# Patient Record
Sex: FEMALE | Race: WHITE | NOT HISPANIC OR LATINO | Employment: STUDENT | URBAN - METROPOLITAN AREA
[De-identification: names, ages, dates, MRNs, and addresses within clinical notes are randomized per-mention and may not be internally consistent; named-entity substitution may affect disease eponyms.]

---

## 2024-03-16 ENCOUNTER — HOSPITAL ENCOUNTER (EMERGENCY)
Facility: HOSPITAL | Age: 34
Discharge: HOME | End: 2024-03-16
Attending: EMERGENCY MEDICINE
Payer: COMMERCIAL

## 2024-03-16 VITALS
OXYGEN SATURATION: 100 % | SYSTOLIC BLOOD PRESSURE: 120 MMHG | WEIGHT: 122 LBS | TEMPERATURE: 98.2 F | DIASTOLIC BLOOD PRESSURE: 84 MMHG | HEART RATE: 85 BPM | HEIGHT: 64 IN | RESPIRATION RATE: 18 BRPM | BODY MASS INDEX: 20.83 KG/M2

## 2024-03-16 DIAGNOSIS — K04.7 DENTAL ABSCESS: Primary | ICD-10-CM

## 2024-03-16 PROCEDURE — 96372 THER/PROPH/DIAG INJ SC/IM: CPT

## 2024-03-16 PROCEDURE — 2500000004 HC RX 250 GENERAL PHARMACY W/ HCPCS (ALT 636 FOR OP/ED): Performed by: EMERGENCY MEDICINE

## 2024-03-16 PROCEDURE — 99283 EMERGENCY DEPT VISIT LOW MDM: CPT

## 2024-03-16 PROCEDURE — 2500000001 HC RX 250 WO HCPCS SELF ADMINISTERED DRUGS (ALT 637 FOR MEDICARE OP): Performed by: EMERGENCY MEDICINE

## 2024-03-16 RX ORDER — KETOROLAC TROMETHAMINE 10 MG/1
10 TABLET, FILM COATED ORAL EVERY 6 HOURS PRN
Qty: 20 TABLET | Refills: 0 | Status: SHIPPED | OUTPATIENT
Start: 2024-03-16 | End: 2024-03-16 | Stop reason: SDUPTHER

## 2024-03-16 RX ORDER — KETOROLAC TROMETHAMINE 30 MG/ML
30 INJECTION, SOLUTION INTRAMUSCULAR; INTRAVENOUS ONCE
Status: COMPLETED | OUTPATIENT
Start: 2024-03-16 | End: 2024-03-16

## 2024-03-16 RX ORDER — KETOROLAC TROMETHAMINE 10 MG/1
10 TABLET, FILM COATED ORAL EVERY 6 HOURS PRN
Qty: 20 TABLET | Refills: 0 | Status: SHIPPED | OUTPATIENT
Start: 2024-03-16 | End: 2024-03-21

## 2024-03-16 RX ORDER — CLINDAMYCIN HYDROCHLORIDE 300 MG/1
300 CAPSULE ORAL 3 TIMES DAILY
Qty: 30 CAPSULE | Refills: 0 | Status: SHIPPED | OUTPATIENT
Start: 2024-03-16 | End: 2024-03-26

## 2024-03-16 RX ORDER — CLINDAMYCIN HYDROCHLORIDE 300 MG/1
300 CAPSULE ORAL ONCE
Status: COMPLETED | OUTPATIENT
Start: 2024-03-16 | End: 2024-03-16

## 2024-03-16 RX ORDER — CLINDAMYCIN HYDROCHLORIDE 300 MG/1
300 CAPSULE ORAL 3 TIMES DAILY
Qty: 30 CAPSULE | Refills: 0 | Status: SHIPPED | OUTPATIENT
Start: 2024-03-16 | End: 2024-03-16 | Stop reason: SDUPTHER

## 2024-03-16 RX ADMIN — KETOROLAC TROMETHAMINE 30 MG: 30 INJECTION, SOLUTION INTRAMUSCULAR at 01:51

## 2024-03-16 RX ADMIN — CLINDAMYCIN HYDROCHLORIDE 300 MG: 300 CAPSULE ORAL at 01:35

## 2024-03-16 NOTE — ED PROVIDER NOTES
HPI   Chief Complaint   Patient presents with    Dental Pain     Pt stated she has been having tooth pain since Tuesday. Pt c/o right upper tooth pain. Pt was prescribed PENICILLIN 500 mg, but was afraid of taking it because her face swelled over night after taking it.        Patient presents to the emergency room today after stating that she noticed some swelling to the right side of her face.  The patient has a problem with a tooth ache, and was due to follow-up with her dentist today.  The patient was started on penicillin, however she stated that she noticed some swelling to the right side of her face, became concerned that perhaps she was having allergic reaction to the medication.  Patient has been on amoxicillin before in the past.  Patient states the swelling has since gone down.  Patient states that the pain is not severe.  Patient denies fevers or chills.  Patient denies any difficulty breathing.  Patient denies any difficulty swallowing.      No data recorded    Patient History   No past medical history on file.  No past surgical history on file.  No family history on file.  Social History     Tobacco Use    Smoking status: Not on file    Smokeless tobacco: Not on file   Substance Use Topics    Alcohol use: Not on file    Drug use: Not on file       Physical Exam   ED Triage Vitals [03/16/24 0057]   Temperature Heart Rate Respirations BP   36.8 °C (98.2 °F) 85 18 120/84      Pulse Ox Temp Source Heart Rate Source Patient Position   100 % Tympanic -- Sitting      BP Location FiO2 (%)     Left arm --       Physical Exam  Vitals and nursing note reviewed.   Constitutional:       General: She is not in acute distress.     Appearance: She is well-developed.   HENT:      Head: Normocephalic and atraumatic.      Mouth/Throat:      Comments: No periapical abscess noted.  Eyes:      Conjunctiva/sclera: Conjunctivae normal.   Cardiovascular:      Rate and Rhythm: Normal rate and regular rhythm.      Heart sounds:  No murmur heard.  Pulmonary:      Effort: Pulmonary effort is normal. No respiratory distress.      Breath sounds: Normal breath sounds.   Abdominal:      Palpations: Abdomen is soft.      Tenderness: There is no abdominal tenderness.   Musculoskeletal:      Cervical back: Neck supple.   Neurological:      Mental Status: She is alert.         ED Course & MDM   Diagnoses as of 03/16/24 0135   Dental abscess       Medical Decision Making  After my initial evaluation, I am going to switch the patient's antibiotic from penicillin to clindamycin at her request.  I will provide the patient with a shot of Toradol to help with the swelling.  The patient will put on Toradol and clindamycin for home.  The patient was encouraged to follow-up with her dentist as an outpatient.  Patient, my estimation, is not allergic to penicillin.        Procedure  Procedures     Joel Kim DO  03/16/24 0216

## 2024-10-17 ENCOUNTER — OFFICE VISIT (OUTPATIENT)
Dept: PRIMARY CARE | Facility: CLINIC | Age: 34
End: 2024-10-17
Payer: COMMERCIAL

## 2024-10-17 VITALS
SYSTOLIC BLOOD PRESSURE: 104 MMHG | WEIGHT: 130 LBS | RESPIRATION RATE: 16 BRPM | OXYGEN SATURATION: 99 % | HEART RATE: 90 BPM | BODY MASS INDEX: 22.31 KG/M2 | DIASTOLIC BLOOD PRESSURE: 62 MMHG

## 2024-10-17 DIAGNOSIS — F32.A DEPRESSION, UNSPECIFIED DEPRESSION TYPE: ICD-10-CM

## 2024-10-17 DIAGNOSIS — F90.0 ATTENTION DEFICIT HYPERACTIVITY DISORDER (ADHD), PREDOMINANTLY INATTENTIVE TYPE: Primary | ICD-10-CM

## 2024-10-17 PROBLEM — J45.909 ASTHMA: Status: ACTIVE | Noted: 2024-10-17

## 2024-10-17 PROBLEM — F90.9 ADHD: Status: ACTIVE | Noted: 2024-10-17

## 2024-10-17 PROCEDURE — 99204 OFFICE O/P NEW MOD 45 MIN: CPT | Performed by: INTERNAL MEDICINE

## 2024-10-17 RX ORDER — LISDEXAMFETAMINE DIMESYLATE 10 MG/1
10 CAPSULE ORAL
Qty: 90 CAPSULE | Refills: 0 | Status: SHIPPED | OUTPATIENT
Start: 2024-10-17

## 2024-10-17 RX ORDER — DEXTROAMPHETAMINE SACCHARATE, AMPHETAMINE ASPARTATE, DEXTROAMPHETAMINE SULFATE AND AMPHETAMINE SULFATE 5; 5; 5; 5 MG/1; MG/1; MG/1; MG/1
20 TABLET ORAL DAILY
Qty: 30 TABLET | Refills: 0 | Status: SHIPPED | OUTPATIENT
Start: 2024-10-17 | End: 2024-11-16

## 2024-10-17 RX ORDER — DEXTROAMPHETAMINE SACCHARATE, AMPHETAMINE ASPARTATE, DEXTROAMPHETAMINE SULFATE AND AMPHETAMINE SULFATE 5; 5; 5; 5 MG/1; MG/1; MG/1; MG/1
20 TABLET ORAL DAILY
Qty: 30 TABLET | Refills: 0 | Status: SHIPPED | OUTPATIENT
Start: 2024-12-16 | End: 2025-01-15

## 2024-10-17 RX ORDER — DEXTROAMPHETAMINE SACCHARATE, AMPHETAMINE ASPARTATE, DEXTROAMPHETAMINE SULFATE AND AMPHETAMINE SULFATE 5; 5; 5; 5 MG/1; MG/1; MG/1; MG/1
20 TABLET ORAL DAILY
Qty: 30 TABLET | Refills: 0 | Status: SHIPPED | OUTPATIENT
Start: 2024-11-16 | End: 2024-12-16

## 2024-10-17 RX ORDER — LISDEXAMFETAMINE DIMESYLATE 10 MG/1
1 CAPSULE ORAL
COMMUNITY
Start: 2024-09-26 | End: 2024-10-17 | Stop reason: SDUPTHER

## 2024-10-17 RX ORDER — DEXTROAMPHETAMINE SULFATE, DEXTROAMPHETAMINE SACCHARATE, AMPHETAMINE SULFATE AND AMPHETAMINE ASPARTATE 5; 5; 5; 5 MG/1; MG/1; MG/1; MG/1
1 CAPSULE, EXTENDED RELEASE ORAL
COMMUNITY
Start: 2024-09-18 | End: 2024-10-17 | Stop reason: ALTCHOICE

## 2024-10-17 RX ORDER — SERTRALINE HYDROCHLORIDE 50 MG/1
50 TABLET, FILM COATED ORAL DAILY
Qty: 90 TABLET | Refills: 3 | Status: SHIPPED | OUTPATIENT
Start: 2024-10-17 | End: 2025-10-12

## 2024-10-17 ASSESSMENT — PAIN SCALES - GENERAL: PAINLEVEL_OUTOF10: 0-NO PAIN

## 2024-10-17 NOTE — PROGRESS NOTES
Subjective   Madiha Mccray is a 34 y.o. female who presents for ADHD (Pt is being seen for adhd medication concerns).  Patient presents to [facility] care.  She has a past medical history of ADHD. She was sent to a pediatric psychiatrist by her pediatrician at age 6. She has been on medication since.   She requests medication refills.  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  Patient advised that she can have a short course of her stimulants but would need to follow with Psychiatry for any refills after that.    ADHD        Objective     /62   Pulse 90   Resp 16   Wt 59 kg (130 lb)   SpO2 99%   BMI 22.31 kg/m²      Physical Exam  Constitutional:       Appearance: Normal appearance.   HENT:      Head: Normocephalic and atraumatic.      Nose: Nose normal.      Mouth/Throat:      Mouth: Mucous membranes are moist.      Pharynx: Oropharynx is clear.   Eyes:      Extraocular Movements: Extraocular movements intact.      Pupils: Pupils are equal, round, and reactive to light.   Cardiovascular:      Rate and Rhythm: Normal rate and regular rhythm.   Pulmonary:      Effort: No respiratory distress.      Breath sounds: Normal breath sounds. No wheezing, rhonchi or rales.   Abdominal:      General: Bowel sounds are normal. There is no distension.      Palpations: Abdomen is soft.      Tenderness: There is no abdominal tenderness. There is no guarding.   Musculoskeletal:      Right lower leg: No edema.      Left lower leg: No edema.   Skin:     General: Skin is warm and dry.   Neurological:      Mental Status: She is alert and oriented to person, place, and time. Mental status is at baseline.   Psychiatric:         Mood and Affect: Mood normal.         Behavior: Behavior normal.         Assessment/Plan   Problem List Items Addressed This Visit       ADHD - Primary    Relevant Medications    Vyvanse 10 mg capsule    amphetamine-dextroamphetamine (Adderall) 20 mg tablet     amphetamine-dextroamphetamine (Adderall) 20 mg tablet (Start on 11/16/2024)    amphetamine-dextroamphetamine (Adderall) 20 mg tablet (Start on 12/16/2024)    Other Relevant Orders    Referral to Psychiatry     Other Visit Diagnoses       Depression, unspecified depression type        Relevant Medications    sertraline (Zoloft) 50 mg tablet    Other Relevant Orders    Referral to Psychiatry                 Toni Smith DO

## 2025-01-08 ENCOUNTER — APPOINTMENT (OUTPATIENT)
Dept: BEHAVIORAL HEALTH | Facility: CLINIC | Age: 35
End: 2025-01-08
Payer: COMMERCIAL

## 2025-01-08 DIAGNOSIS — F90.2 ATTENTION DEFICIT HYPERACTIVITY DISORDER (ADHD), COMBINED TYPE: ICD-10-CM

## 2025-01-08 DIAGNOSIS — F32.A DEPRESSION, UNSPECIFIED DEPRESSION TYPE: ICD-10-CM

## 2025-01-08 DIAGNOSIS — F90.0 ATTENTION DEFICIT HYPERACTIVITY DISORDER (ADHD), PREDOMINANTLY INATTENTIVE TYPE: ICD-10-CM

## 2025-01-08 PROCEDURE — 90791 PSYCH DIAGNOSTIC EVALUATION: CPT | Performed by: PSYCHOLOGIST

## 2025-01-08 NOTE — PROGRESS NOTES
Outpatient Psychology Initial Evaluation for ADHD    Start Time: 1500  Stop Time: 1550      Reason for Referral:    Madiha Mccray, a 34 y.o. female, presents for an ADHD evaluation. Patient is referred by Toni Smith DO.  Patient is informed of the purpose of this evaluation and agrees to participate.     Pt was diagnosed with ADHD, combined type at the age of 6. Pt states her pediatrician referred her for neuropsych testing, which confirmed the diagnosis. Pediatrician prescribed Ritalin. Pt now prescribed Vyvanse 40mg. Pt presents seeking re-evaluation of ADHD as an adult.     Procedures:  Medical records review  Clinical interview with Madiha Mccray    Developmental History:   -date/place of birth:  born and raised in [place], raised by both parents (dad  2 years ago). Pt has a younger brother who's 22. She had an older brother with a developmental disability who  from a blood clot when he was 27.    -pregnancy/birth complications: no  -childhood illness? Ear infections? Tubes in ears?: no  -history of PT/OT/Speech?: no  -developmental milestones, including sitting up, crawling, walking, talking: pt walked a bit late because older brother didn't want her to get her shoes dirty. Milestones otherwise on time.   -temperament/activity level as a young child: hyperactive as a child  -ER visits for injuries?: no     Family History:  -Will complete at next encounter    Academic History:   -Will complete at next encounter    Employment History:   -Will complete at next encounter    Relationships:   -Will complete at next encounter    Social Contacts:  -Will complete at next encounter    Hobbies/Interests:  -Will complete at next encounter    Other Areas of Concern:  -Will complete at next encounter    Past Psychiatric History  Previous diagnoses: yes - ADHD, combined type and depression  Previous psychiatric treatment and medication trials: currently prescribed 40 mg Vyvanse by a psychiatrist  through [facility]. Previously took Ritalin for many years then switched to Adderall around age 19 until recently. Pt also prescribed sertraline 50mg.   Previous psychotherapy: no  Previous self-injurious, non suicidal behavior: no   Previous psychiatric hospitalizations: no  Previous suicide attempts: no  History of abuse/trauma:  no  History of violence:  no  Depression: mostly seasonal affective  Anxiety: occasional panic attacks, sometimes worries too much  Isa: negative   Psychosis: negative   Sleep problems: Absent      Substance Use History  Use of marijuana: none  Use of alcohol: none  Use of caffeine: 8-12 oz cup of coffee per day  Tobacco use: smoked 1ppd from age [age]. Pt currently vapes nicotine daily, throughout the day. 1 bottle of vape juice lasts 2 weeks.   Recreational drugs: none    Psychosocial History  Marital Status: not  but in a committed relationships  Children: No  Living Situation: lives in [place]. She lives alone; has a dog and cat.  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    History of concussions, seizures, or TBI?: no    Surgical History  No past surgical history on file.    Current Medications  Current Outpatient Medications   Medication Instructions    amphetamine-dextroamphetamine (Adderall) 20 mg tablet 20 mg, oral, Daily    amphetamine-dextroamphetamine (Adderall) 20 mg tablet 20 mg, oral, Daily    amphetamine-dextroamphetamine (Adderall) 20 mg tablet 20 mg, oral, Daily    sertraline (ZOLOFT) 50 mg, oral, Daily    Vyvanse 10 mg, oral, Daily (0630)          Mental Status Evaluation  General Appearance: well groomed, appropriate eye contact  Attitude/Behavior:  cooperative  Motor: no psychomotor agitation or retardation, no tremor or other abnormal movements  Speech: occasionally rapid speech but otherwise WNL  Gait/Station: pt seated for telehealth interview  Mood: euthymic   Affect: euthymic, full-range  Thought Process: linear, goal directed  Thought Associations: no loosening of associations  Thought Content: No SI/HI or delusions  Perception: no perceptual abnormalities noted  Sensorium: alert and oriented to person, place, time and situation  Insight: intact  Judgment: intact  Cognition: cognitively intact to conversational testing with respect to attention, orientation, fund of knowledge, recent and remote memory, and language        Assessment  Pt with a hx of ADHD, Combined Type diagnosed in childhood presents for adult ADHD evaluation.      Impression  ADHD, Combined Type, by history  Other depressive disorder vs. SAD    Recommendations  1) Will continue evaluation at next encounter.     ______________________________________________________  Karolyn Campbell, PhD  ______________________________________________________  An interactive audio and video telecommunication system which permits real time communications between the patient (at the originating site) and provider (at the distant site) was utilized to provide this telehealth service.      Verbal consent was requested and obtained from Madiha Mccray on this date, 2025, for a telehealth visit.     I have confirmed the patient's identity via the following (minimum of three) acceptable identifiers as per  Policy PH-9: address, , SSN.

## 2025-01-15 ENCOUNTER — APPOINTMENT (OUTPATIENT)
Dept: BEHAVIORAL HEALTH | Facility: CLINIC | Age: 35
End: 2025-01-15
Payer: COMMERCIAL

## 2025-01-15 DIAGNOSIS — F90.2 ATTENTION DEFICIT HYPERACTIVITY DISORDER (ADHD), COMBINED TYPE: ICD-10-CM

## 2025-01-15 PROCEDURE — 90837 PSYTX W PT 60 MINUTES: CPT | Performed by: PSYCHOLOGIST

## 2025-01-15 NOTE — PROGRESS NOTES
Outpatient Psychology Evaluation for ADHD - Continued    Start Time: 1500  Stop Time: 1554      Reason for Referral:    Madiha Mccray, a 34 y.o. female, presents for an ADHD evaluation. Patient is referred by Toni Smith DO.  Patient is informed of the purpose of this evaluation and agrees to participate.     Pt was diagnosed with ADHD, combined type at the age of 6. Pt states her pediatrician referred her for neuropsych testing, which confirmed the diagnosis. Pediatrician prescribed Ritalin. Pt now prescribed Vyvanse 40mg. Pt presents seeking re-evaluation of ADHD as an adult.     Procedures:  Medical records review  Clinical interview with Madiha Mccray    Developmental History:   -date/place of birth:  born and raised in [place], raised by both parents (dad  2 years ago). Pt has a younger brother who's 22. She had an older brother with a developmental disability who  from a blood clot when he was 27.    -pregnancy/birth complications: no  -childhood illness? Ear infections? Tubes in ears?: no  -history of PT/OT/Speech?: no  -developmental milestones, including sitting up, crawling, walking, talking: pt walked a bit late because older brother didn't want her to get her shoes dirty. Milestones otherwise on time.   -temperament/activity level as a young child: hyperactive as a child  -ER visits for injuries?: no     Family History:  -parents'/siblings' education/occupation: mom didn't finish high school and stayed at home, dad didn't finish high school and worked in [employer]  -family psychiatric hx: mom with anxiety, younger brother with anxiety and OCD, older brother with developmental delay and schizophrenia  -family substance use hx:   -family history of problems with attention, learning, or impulsive behavior: Dad was very impulsive  -family diagnoses of ADHD: not official     Academic History:     Elementary School  -Where did you go to school? K-10 in person public school at [place] [employer],  "11-12 home school.   -attendance: good  -difficulty learning to read, math problems, etc.: no  -What did teachers say about you? prior to diagnosis that pt was bouncing off the walls  -Difficulty sitting still in class/need to move around: much better after she started on meds   -Difficulty paying attention/daydreaming: after meds, no  -Blurting out answers  -grades: did well   -tutoring: no  -special classes: no  -school behavior: good  -disciplinary actions: no  -best & worst subjects: worst was math, best was science    Middle School    -What was transition to middle school like?: fine   -Attendance: good  -grades? Any drop in grades?: \"ok\"  -tutoring: math  -Difficulty managing workload: sometimes not getting homework done  -More support from parents to complete work?: mom had to help her a lot but she usually didn't get started until later in the evening.  -Use of planner?: yes   -What did bedroom and backpack look like?  -extracurricular activities: beauty pageants  -school behavior  -disciplinary actions: was always late in the mornings and got detentions    High school  -What was transition to school school like?: Difficult. Pt's older brother  the first week of high school. Went to a bereavement group all of Freshman year.   -Attendance: good  -grades? Any drop in grades?: Grades dropped when brother   -tutoring: a friend helped pt with subjects she struggled with  -special classes: failed classes in 9th grade and had to go to summer school   -Difficulty managing workload: ongoing trouble getting started on work but also stopped taking ADHD meds around 17 and then restarted right before turning 19. Got motivated to go the college and new she needed to take the meds in order to do that.   -extracurricular activities: no  -school behavior: good  -disciplinary actions: detentions for being late  -GPA: \"not good\"   -AP classes?: no  -alcohol/substance use: occasional alcohol     -SAT/ACT?  -Prep " courses?    College  -Where did you attend?: [employer] for medical assisting (1 year), then did prereqs at [employer], then [employer] got Associate's degree (2.5 year), [employer] online to finish B.A. 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   -Pt's dad was also dying while she was finishing her B.A.     -Had to take NCLEX 5 times. Was able to pass after a medication change.    Graduate School  -Did an M.A. 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 Went back and finished her B.A. at this time.      Relationships:   -Will complete at next encounter    Social Contacts:  -Will complete at next encounter    Hobbies/Interests:  -Will complete at next encounter    Other Areas of Concern:  -Will complete at next encounter    Past Psychiatric History  Previous diagnoses: yes - ADHD, combined type and depression  Previous psychiatric treatment and medication trials: currently prescribed 40 mg Vyvanse by a psychiatrist through [facility]. Previously took Ritalin for many years then switched to Adderall around age 19 until recently. Pt also prescribed sertraline 50mg.   Previous psychotherapy: no  Previous self-injurious, non suicidal behavior: no   Previous psychiatric hospitalizations: no  Previous suicide attempts: no  History of abuse/trauma:  found her brother after he ; denied PTSD sx  History of violence:  no  Depression: mostly seasonal affective  Anxiety: occasional panic attacks, sometimes worries too much  Isa: negative   Psychosis: negative   Sleep problems: Absent      Substance Use History  Use of marijuana: none  Use of alcohol:  none  Use of caffeine: 8-12 oz cup of coffee per day  Tobacco use: smoked 1ppd from age [age]. Pt currently vapes nicotine daily, throughout the day. 1 bottle of vape juice lasts 2 weeks.   Recreational drugs: none    Psychosocial History  Marital Status: not  but in a committed relationships  Children: No  Living Situation: lives in [place]. She lives alone; has a dog and cat.  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    History of concussions, seizures, or TBI?: no    Surgical History  No past surgical history on file.    Current Medications  Current Outpatient Medications   Medication Instructions    amphetamine-dextroamphetamine (Adderall) 20 mg tablet 20 mg, oral, Daily    amphetamine-dextroamphetamine (Adderall) 20 mg tablet 20 mg, oral, Daily    amphetamine-dextroamphetamine (Adderall) 20 mg tablet 20 mg, oral, Daily    sertraline (ZOLOFT) 50 mg, oral, Daily    Vyvanse 10 mg, oral, Daily (0630)          Mental Status Evaluation  General Appearance: well groomed, appropriate eye contact  Attitude/Behavior: cooperative  Motor: no psychomotor agitation or retardation, no tremor or other abnormal movements  Speech: occasionally rapid speech but otherwise WNL  Gait/Station: pt seated for telehealth interview  Mood: euthymic   Affect: euthymic, full-range  Thought Process: linear, goal directed  Thought Associations: no loosening of associations  Thought Content: No SI/HI or delusions  Perception: no perceptual abnormalities noted  Sensorium: alert and oriented to person, place, time and situation  Insight: intact  Judgment: intact  Cognition: cognitively intact to conversational testing with respect to attention,  orientation, fund of knowledge, recent and remote memory, and language        Assessment  Pt with a hx of ADHD, Combined Type diagnosed in childhood presents for adult ADHD evaluation.      Impression  ADHD, Combined Type, by history  Other depressive disorder vs. SAD    Recommendations  1) Will continue evaluation at next encounter.     ______________________________________________________  Karolyn Campbell, PhD  ______________________________________________________  An interactive audio and video telecommunication system which permits real time communications between the patient (at the originating site) and provider (at the distant site) was utilized to provide this telehealth service.      Verbal consent was requested and obtained from Madiha Mccray on this date, 1/15/2025, for a telehealth visit.     I have confirmed the patient's identity via the following (minimum of three) acceptable identifiers as per  Policy PH-9: address, , SSN.

## 2025-01-27 ENCOUNTER — APPOINTMENT (OUTPATIENT)
Dept: BEHAVIORAL HEALTH | Facility: CLINIC | Age: 35
End: 2025-01-27
Payer: COMMERCIAL

## 2025-01-27 DIAGNOSIS — F90.2 ATTENTION DEFICIT HYPERACTIVITY DISORDER (ADHD), COMBINED TYPE: ICD-10-CM

## 2025-01-27 PROCEDURE — 90832 PSYTX W PT 30 MINUTES: CPT | Performed by: PSYCHOLOGIST

## 2025-01-27 NOTE — PROGRESS NOTES
Outpatient Psychology Evaluation for ADHD - Final    Start Time: 1500  Stop Time: 1530      Reason for Referral:    Madiha Mccray, a 34 y.o. female, presents for an ADHD evaluation. Patient is referred by Toni Smith DO.  Patient is informed of the purpose of this evaluation and agrees to participate.     Pt was diagnosed with ADHD, combined type at the age of 6. Pt states her pediatrician referred her for neuropsych testing, which confirmed the diagnosis. Pediatrician prescribed Ritalin. Pt now prescribed Vyvanse 40mg. Pt presents seeking re-evaluation of ADHD as an adult.     Procedures:  Medical records review  Academic records review  Clinical interview with Madiha Mccray    Developmental History:   -date/place of birth:  born and raised in [place], raised by both parents (dad  2 years ago). Pt has a younger brother who's 22. She had an older brother with a developmental disability who  from a blood clot when he was 27.    -pregnancy/birth complications: no  -childhood illness? Ear infections? Tubes in ears?: no  -history of PT/OT/Speech?: no  -developmental milestones, including sitting up, crawling, walking, talking: pt walked a bit late because older brother didn't want her to get her shoes dirty. Milestones otherwise on time.   -temperament/activity level as a young child: hyperactive as a child  -ER visits for injuries?: no     Family History:  -parents'/siblings' education/occupation: mom didn't finish high school and stayed at home, dad didn't finish high school and worked in [employer]  -family psychiatric hx: mom with anxiety, younger brother with anxiety and OCD, older brother with developmental delay and schizophrenia  -family substance use hx:   -family history of problems with attention, learning, or impulsive behavior: Dad was very impulsive  -family diagnoses of ADHD: not official     Academic History:     Elementary School  -Where did you go to school? K-10 in person public  "school at [employer], 11-12 home school.   -attendance: good  -difficulty learning to read, math problems, etc.: no  -What did teachers say about you? prior to diagnosis that pt was bouncing off the walls  -Difficulty sitting still in class/need to move around: much better after she started on meds   -Difficulty paying attention/daydreaming: after meds, no  -Blurting out answers  -grades: did well   -tutoring: no  -special classes: no  -school behavior: good  -disciplinary actions: no  -best & worst subjects: worst was math, best was science    Middle School    -What was transition to middle school like?: fine   -Attendance: good  -grades? Any drop in grades?: \"ok\"  -tutoring: math  -Difficulty managing workload: sometimes not getting homework done  -More support from parents to complete work?: mom had to help her a lot but she usually didn't get started until later in the evening.  -Use of planner?: yes   -What did bedroom and backpack look like?  -extracurricular activities: beauty pageants  -school behavior  -disciplinary actions: was always late in the mornings and got detentions    High school  -What was transition to school school like?: Difficult. Pt's older brother  the first week of high school. Went to a bereavement group all of Freshman year.   -Attendance: good  -grades? Any drop in grades?: Grades dropped when brother   -tutoring: a friend helped pt with subjects she struggled with  -special classes: failed classes in 9th grade and had to go to summer school   -Difficulty managing workload: ongoing trouble getting started on work but also stopped taking ADHD meds around 17 and then restarted right before turning 19. Got motivated to go the college and new she needed to take the meds in order to do that.   -extracurricular activities: no  -school behavior: good  -disciplinary actions: detentions for being late  -GPA: \"not good\"   -AP classes?: no  -alcohol/substance use: occasional alcohol "     College  -Where did you attend?: [employer] for medical assisting (1 year), then did prereqs at [employer], then [employer] got Associate's degree (2.5 year), [employer] online to finish B.A. 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   -Pt's dad was also dying while she was finishing her B.A.     -Had to take NCLEX 5 times. Was able to pass after a medication change.    Graduate School  -Did an M.A. 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 Went back and finished her B.A. at this time.   -Pt has a lot of trouble getting all her notes done at work. She is unable to focus. Has to work part-time so she can get her work done, which affects her financially.    -Pt often late to work   -Pt interrupts at work even though she doesn't mean to    Relationships:   -Boyfriend often complains that she doesn't listen to him.  -Boyfriend gets mad she forgets to do things he's asked her to do.     Social Contacts:  -leaves everything until the last minute and often late    Hobbies/Interests:  -Will start home projects and not finish     Other Areas of Concern:  -Will forget to pay bills     Past Psychiatric History  Previous diagnoses: yes - ADHD, combined type and depression  Previous psychiatric treatment and medication trials: currently prescribed 40 mg Vyvanse by a psychiatrist through [facility]. Previously took Ritalin for many years then switched to Adderall around age 19 until recently. Pt also prescribed sertraline 50mg.   Previous psychotherapy:  no  Previous self-injurious, non suicidal behavior: no   Previous psychiatric hospitalizations: no  Previous suicide attempts: no  History of abuse/trauma:  found her brother after he ; denied PTSD sx  History of violence:  no  Depression: mostly seasonal affective  Anxiety: occasional panic attacks, sometimes worries too much  Isa: negative   Psychosis: negative   Sleep problems: Absent      Substance Use History  Use of marijuana: none  Use of alcohol: none  Use of caffeine: 8-12 oz cup of coffee per day  Tobacco use: smoked 1ppd from age [age]. Pt currently vapes nicotine daily, throughout the day. 1 bottle of vape juice lasts 2 weeks.   Recreational drugs: none    Psychosocial History  Marital Status: not  but in a committed relationships  Children: No  Living Situation: lives in [place]. She lives alone; has a dog and cat.  Support System: best friend, closed friends, boyfriend, mom  Financial Concerns: No    Legal: No arrest history    History: no   Work: pt is a Psych NP and works for [employer]   Interests: working out, spending time with her animals, hanging out with friends, listening to music    Medical History  No past medical history on file.    History of concussions, seizures, or TBI?: no    Surgical History  No past surgical history on file.    Current Medications  Current Outpatient Medications   Medication Instructions    amphetamine-dextroamphetamine (Adderall) 20 mg tablet 20 mg, oral, Daily    amphetamine-dextroamphetamine (Adderall) 20 mg tablet 20 mg, oral, Daily    amphetamine-dextroamphetamine (Adderall) 20 mg tablet 20 mg, oral, Daily    sertraline (ZOLOFT) 50 mg, oral, Daily    Vyvanse 10 mg, oral, Daily (0630)          Mental Status Evaluation  General Appearance: well groomed, appropriate eye contact  Attitude/Behavior: cooperative  Motor: no psychomotor agitation or retardation, no tremor or other abnormal movements  Speech: occasionally rapid speech but otherwise  WNL  Gait/Station: pt seated for telehealth interview  Mood: euthymic   Affect: euthymic, full-range  Thought Process: linear, goal directed  Thought Associations: no loosening of associations  Thought Content: No SI/HI or delusions  Perception: no perceptual abnormalities noted  Sensorium: alert and oriented to person, place, time and situation  Insight: intact  Judgment: intact  Cognition: cognitively intact to conversational testing with respect to attention, orientation, fund of knowledge, recent and remote memory, and language        Assessment  Pt with a hx of ADHD, Combined Type diagnosed in childhood presents for adult ADHD evaluation. Based on comprehensive interview, and review of medical and academic records, pt does meet criteria for ADHD, Combined Type.     Impression  ADHD, Combined Type  Other depressive disorder vs. SAD    Recommendations  1) Agree with continued medication management of ADHD symptoms.     ______________________________________________________  Karolyn Campbell, PhD  ______________________________________________________  An interactive audio and video telecommunication system which permits real time communications between the patient (at the originating site) and provider (at the distant site) was utilized to provide this telehealth service.      Verbal consent was requested and obtained from Madiha Mccray on this date, 2025, for a telehealth visit.     I have confirmed the patient's identity via the following (minimum of three) acceptable identifiers as per  Policy PH-9: address, , SSN.

## 2025-05-20 ENCOUNTER — APPOINTMENT (OUTPATIENT)
Dept: DERMATOLOGY | Facility: CLINIC | Age: 35
End: 2025-05-20
Payer: COMMERCIAL